# Patient Record
(demographics unavailable — no encounter records)

---

## 2025-01-08 NOTE — ASSESSMENT
[FreeTextEntry1] : 68 yo male with microscopic hematuria. Given age and smoking history he is high risk and will undergo CT urogram and cystoscopy.  Plan Urinalysis Urine culture CT urogram FU after CT scan to discuss images and will schedule cystoscopy   Patient is being seen today for evaluation and management of a chronic and longitudinal ongoing condition and I am of the primary treating physician.

## 2025-01-08 NOTE — HISTORY OF PRESENT ILLNESS
[FreeTextEntry1] : Polish  #[id number]  68 yo male with microscopic hematuria. States he has never had gross hematuria. He went to his PCP beginning of Dec 2024 and was told he had microscopic hematuria. Denies dysuria. States he does have urinary frequency because he takes furosemide. He has history of heart valve replacement and takes xerelto.   Previous smoker. He quit about 15 years ago.

## 2025-01-10 NOTE — HISTORY OF PRESENT ILLNESS
[Former] : former [>= 20 pack years] : >= 20 pack years [Never] : never [TextBox_4] : 67 year old patient presents for evaluation of  cough sides of lung hurt.  Former smoker   Has CT chest  8/26/24  in [place].  Per report, nodules up to 6 mm, scarring emphysema, splenomegaly     He is on Xarelto  He is on inhaled bronchodilator   he used Trelegy  recently in [place]   [place]   [id number] He lives in [place]  He states he stopped smoking  15 years ago    PSH:    heart valve replacement   eye injury   PMH:  DM  COPD  HTN  valve replacement    HLD  on diuretic furosemide   SH:   never smoker   former smoker  >20 pk years quit 15 years ago     ETOH:  occasional       ALLERGY:   NKDA     environmental/seasonal allergy:  none       Review of Systems:    no sinusitis, sinus infections, nasal obstruction no dysphagia no dry mouth      no pneumonia  no lung cancer   no CAD no MI no chest pain no murmur    no peptic ulcer or gastritis no GERD no abdominal pain   no thyroid disease      no bleeding   no DVT or PE   no kidney disease   no stroke no seizure                         [TextBox_11] : 1 [TextBox_13] : 30+ [YearQuit] : 2010

## 2025-01-10 NOTE — PHYSICAL EXAM
[No Acute Distress] : no acute distress [Well Nourished] : well nourished [Well Developed] : well developed [Normal Oropharynx] : normal oropharynx [III] : Mallampati Class: III [Normal Appearance] : normal appearance [No Neck Mass] : no neck mass [Normal Rate/Rhythm] : normal rate/rhythm [Normal S1, S2] : normal s1, s2 [No Murmurs] : no murmurs [No Resp Distress] : no resp distress [Clear to Auscultation Bilaterally] : clear to auscultation bilaterally [No Abnormalities] : no abnormalities [Benign] : benign [Normal Gait] : normal gait [No Clubbing] : no clubbing [No Cyanosis] : no cyanosis [No Edema] : no edema [FROM] : FROM [Normal Color/ Pigmentation] : normal color/ pigmentation [No Focal Deficits] : no focal deficits [Oriented x3] : oriented x3 [Normal Affect] : normal affect [TextBox_68] : good aeration

## 2025-01-10 NOTE — DISCUSSION/SUMMARY
[FreeTextEntry1] : 67 year old patient presents for evaluation of  cough sides of lung hurt.  Former smoker   Has CT chest  8/26/24  in [place].  Per report, nodules up to 6 mm, scarring emphysema, splenomegaly     He is on Xarelto  He is on inhaled bronchodilator   he used Trelegy  recently in [place]   [place]   [id number] He lives in [place]  He states he stopped smoking  15 years ago   He worked grinding stone, sandblasting  No  history of TB   Impression  COPD, obstructive airway disease    lung nodules  increased cough  PLAN Restart on Trelegy  once daily  with albuterol MDI as needed Repeat CT chest to follow nodules  I have explained via  that his CT chest in [place] that was reviewed using  demonstrated lung nodules that could represent early lung cancer   CT chest ordered . The order has been placed.  Pt is agreeable.   Monitor symptoms  follow up   Total time of the encounter: 45 minutes which included but was not limited to the following: Face-to-face and non face-to-face time personally spent by the physician preparing to see the patient, obtaining and/or resuming separately obtained history, performing a medically appropriate examination and/or evaluation, counseling and educating the patient/family/caregiver, ordering medications, tests or procedures, referring and communicating with other healthcare professionals, documenting clinical information in the electronic health record, independently interpreting results and communicated results to the patient/family/caregiver and care coordination.    Hai Mills MD

## 2025-01-10 NOTE — REASON FOR VISIT
[Consultation] : a consultation [Cough] : cough [COPD] : COPD [Pulmonary Nodules] : pulmonary nodules

## 2025-01-10 NOTE — HISTORY OF PRESENT ILLNESS
[Former] : former [>= 20 pack years] : >= 20 pack years [Never] : never [TextBox_4] : 67 year old patient presents for evaluation of  cough sides of lung hurt.  Former smoker   Has CT chest  8/26/24  in [place].  Per report, nodules up to 6 mm, scarring emphysema, splenomegaly     He is on Xarelto  He is on inhaled bronchodilator   he used Trelegy  recently in [place]   [place]   [id number] He lives in [place]  He states he stopped smoking  15 years ago    PSH:    heart valve replacement   eye injury   PMH:  DM  COPD  HTN  valve replacement    HLD  on diuretic furosemide   SH:   never smoker   former smoker  >20 pk years quit 15 years ago     ETOH:  occasional       ALLERGY:   NKDA     environmental/seasonal allergy:  none       Review of Systems:    no sinusitis, sinus infections, nasal obstruction no dysphagia no dry mouth      no pneumonia  no lung cancer   no CAD no MI no chest pain no murmur    no peptic ulcer or gastritis no GERD no abdominal pain   no thyroid disease      no bleeding   no DVT or PE   no kidney disease   no stroke no seizure                         [TextBox_11] : 1 [TextBox_13] : 30+ [YearQuit] : 2010

## 2025-01-10 NOTE — DISCUSSION/SUMMARY
[FreeTextEntry1] : 67 year old patient presents for evaluation of  cough sides of lung hurt.  Former smoker   Has CT chest  8/26/24  in [place].  Per report, nodules up to 6 mm, scarring emphysema, splenomegaly     He is on Xarelto  He is on inhaled bronchodilator   he used Trelegy  recently in [place]   [place]   [id number] He lives in [place]  He states he stopped smoking  15 years ago   He worked grinding stone, sandblasting  No  history of TB   Impression  COPD, obstructive airway disease    lung nodules  increased cough  PLAN Restart on Trelegy  once daily  with albuterol MDI as needed Repeat CT chest to follow nodules  I have explained via  that his CT chest in [place] that was reviewed using  demonstrated lung nodules that could represent early lung cancer   CT chest ordered . The order has been placed.  Pt is agreeable.   Monitor symptoms  follow up   Total time of the encounter: 45 minutes which included but was not limited to the following: Face-to-face and non face-to-face time personally spent by the physician preparing to see the patient, obtaining and/or resuming separately obtained history, performing a medically appropriate examination and/or evaluation, counseling and educating the patient/family/caregiver, ordering medications, tests or procedures, referring and communicating with other healthcare professionals, documenting clinical information in the electronic health record, independently interpreting results and communicated results to the patient/family/caregiver and care coordination.    Hai Mills MD

## 2025-01-15 NOTE — HISTORY OF PRESENT ILLNESS
[FreeTextEntry1] : Polish  #[id number]  66 yo male with microscopic hematuria. States he has never had gross hematuria. He went to his PCP beginning of Dec 2024 and was told he had microscopic hematuria. Denies dysuria. States he does have urinary frequency because he takes furosemide. He has history of heart valve replacement and takes xerelto.   Previous smoker. He quit about 15 years ago.   We discussed his CT Urogram results demonstrating no hydro stones or masses. Bladder with circumferential thickening. Prostate about 35 g.    States he voids frequently during the day and has nocturia x2-3.  Discussed need for cystoscopy however patient states he has had bad experiences in the past with [facility] catheter placement.

## 2025-01-15 NOTE — ASSESSMENT
[FreeTextEntry1] : 68 yo male with microscopic hematuria. We reviewed his ct urogram which did not demonstrate any masses stones or hydro. It does show a prostate about 35 g and symmetrical thickening of bladder which may be from outlet obstruction/BPH. He will trial tamsulosin 0.4 mg daily. Discussed need for cystoscopy to complete evaluation of microscopic hematuria.   Plan Urinalysis reviewed - glucose >1000 Urine culture reviewed - negative CT urogram images reviewed and discussed with patient demonstrating no stones masses or hydros. bladder with circumferential thickening and prostate size about 35 g Trial tamsulosin 0.4 mg daily - prescription sent Schedule for cystoscopy and will do uroflow with PVR after   Patient is being seen today for evaluation and management of a chronic and longitudinal ongoing condition and I am of the primary treating physician.

## 2025-01-26 NOTE — REASON FOR VISIT
[Follow-Up] : a follow-up visit [Cough] : cough [Pulmonary Nodules] : pulmonary nodules [COPD] : COPD

## 2025-01-31 NOTE — HISTORY OF PRESENT ILLNESS
[Former] : former [>= 20 pack years] : >= 20 pack years [Never] : never [TextBox_4] : 67 year old patient presents for evaluation of  cough sides of lung hurt.  Former smoker   Has CT chest  8/26/24  in [place].  Per report, nodules up to 6 mm, scarring emphysema, splenomegaly     He is on Xarelto  He is on inhaled bronchodilator   he used Trelegy  recently in [place]   [place]   [id number] He lives in [place]  He states he stopped smoking  15 years ago   PMD is [facility]  [phone], fax [phone]   PSH:    heart valve replacement   eye injury   PMH:  DM  COPD  HTN  valve replacement    HLD  on diuretic furosemide   SH:   never smoker   former smoker  >20 pk years quit 15 years ago     ETOH:  occasional       ALLERGY:   NKDA     environmental/seasonal allergy:  none       Review of Systems:    no sinusitis, sinus infections, nasal obstruction no dysphagia no dry mouth      no pneumonia  no lung cancer   no CAD no MI no chest pain no murmur    no peptic ulcer or gastritis no GERD no abdominal pain   no thyroid disease      no bleeding   no DVT or PE   no kidney disease   no stroke no seizure                         [TextBox_11] : 1 [TextBox_13] : 30+ [YearQuit] : 2010

## 2025-01-31 NOTE — PHYSICAL EXAM
[No Acute Distress] : no acute distress [Well Nourished] : well nourished [Well Developed] : well developed [Normal Oropharynx] : normal oropharynx [III] : Mallampati Class: III [Normal Appearance] : normal appearance [No Neck Mass] : no neck mass [Normal Rate/Rhythm] : normal rate/rhythm [No Murmurs] : no murmurs [Normal S1, S2] : normal s1, s2 [No Resp Distress] : no resp distress [Clear to Auscultation Bilaterally] : clear to auscultation bilaterally [No Abnormalities] : no abnormalities [Benign] : benign [No Clubbing] : no clubbing [Normal Gait] : normal gait [No Cyanosis] : no cyanosis [No Edema] : no edema [FROM] : FROM [Normal Color/ Pigmentation] : normal color/ pigmentation [No Focal Deficits] : no focal deficits [Oriented x3] : oriented x3 [Normal Affect] : normal affect [TextBox_68] : good aeration

## 2025-01-31 NOTE — PHYSICAL EXAM
[No Acute Distress] : no acute distress [Well Nourished] : well nourished [Well Developed] : well developed [III] : Mallampati Class: III [Normal Oropharynx] : normal oropharynx [Normal Appearance] : normal appearance [No Neck Mass] : no neck mass [Normal Rate/Rhythm] : normal rate/rhythm [No Murmurs] : no murmurs [Normal S1, S2] : normal s1, s2 [No Resp Distress] : no resp distress [Clear to Auscultation Bilaterally] : clear to auscultation bilaterally [No Abnormalities] : no abnormalities [Benign] : benign [Normal Gait] : normal gait [No Clubbing] : no clubbing [No Cyanosis] : no cyanosis [No Edema] : no edema [FROM] : FROM [Normal Color/ Pigmentation] : normal color/ pigmentation [No Focal Deficits] : no focal deficits [Oriented x3] : oriented x3 [Normal Affect] : normal affect [TextBox_68] : good aeration

## 2025-01-31 NOTE — DISCUSSION/SUMMARY
[FreeTextEntry1] : 67 year old patient presents for evaluation of  cough sides of lung hurt.  Former smoker   Has CT chest  8/26/24  in [place].  Per report, nodules up to 6 mm, scarring emphysema, splenomegaly     He is on Xarelto  He is on inhaled bronchodilator   he used Trelegy  recently in [place]   [place]   [id number]  [place] in [place]  He states he stopped smoking  15 years ago   He worked grinding stone, sandblasting  No  history of TB  CT chest was performed:  1/11/25.  It demonstrated moderate emphysema and 1.9 cm GGO nodule with central cystic component  in RLL which is new , raising suspicion for low grade adenoCa.  He has prosthetic heart valves  I discussed findings with the patient and son.  PFT 1/10/25 demonstrated moderate obstructive airway disease   Impression  COPD, obstructive airway disease    lung nodules, new 1.9  cm GGO lesion RLL  increased cough  Started Trelegy  once daily  with albuterol MDI as needed  he will  continue this  Repeat CT chest 06/2025  The order has been placed.  Pt is agreeable.   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  He is unaware  He has had recent blood testing and will provide results  he is going to [place] in 1 week and will return  follow up   Total time of the encounter: 40 minutes which included but was not limited to the following: Face-to-face and non face-to-face time personally spent by the physician preparing to see the patient, obtaining and/or resuming separately obtained history, performing a medically appropriate examination and/or evaluation, counseling and educating the patient/family/caregiver, ordering medications, tests or procedures, referring and communicating with other healthcare professionals, documenting clinical information in the electronic health record, independently interpreting results and communicated results to the patient/family/caregiver and care coordination.    Hai Mills MD

## 2025-01-31 NOTE — HISTORY OF PRESENT ILLNESS
[Former] : former [>= 20 pack years] : >= 20 pack years [Never] : never [TextBox_4] : 67 year old patient presents for evaluation of  cough sides of lung hurt.  Former smoker   Has CT chest  8/26/24  in [place].  Per report, nodules up to 6 mm, scarring emphysema, splenomegaly     He is on Xarelto  He is on inhaled bronchodilator   he used Trelegy  recently in [place]   [place]   [id number] He lives in [place]  He states he stopped smoking  15 years ago   PMD is [facility]  [phone], fax [phone]   PSH:    heart valve replacement   eye injury   PMH:  DM  COPD  HTN  valve replacement    HLD  on diuretic furosemide   SH:   never smoker   former smoker  >20 pk years quit 15 years ago     ETOH:  occasional       ALLERGY:   NKDA     environmental/seasonal allergy:  none       Review of Systems:    no sinusitis, sinus infections, nasal obstruction no dysphagia no dry mouth      no pneumonia  no lung cancer   no CAD no MI no chest pain no murmur    no peptic ulcer or gastritis no GERD no abdominal pain   no thyroid disease      no bleeding   no DVT or PE   no kidney disease   no stroke no seizure                         [TextBox_11] : 1 [TextBox_13] : 30+ [YearQuit] : 2010

## 2025-01-31 NOTE — DISCUSSION/SUMMARY
[FreeTextEntry1] : 67 year old patient presents for evaluation of  cough sides of lung hurt.  Former smoker   Has CT chest  8/26/24  in [place].  Per report, nodules up to 6 mm, scarring emphysema, splenomegaly     He is on Xarelto  He is on inhaled bronchodilator   he used Trelegy  recently in [place]   [place]   [id number]  [place] He lives in [place]  He states he stopped smoking  15 years ago   He worked grinding stone, sandblasting  No  history of TB  CT chest was performed:  1/11/25.  It demonstrated moderate emphysema and 1.9 cm GGO nodule with central cystic component  in RLL which is new , raising suspicion for low grade adenoCa.  He has prosthetic heart valves  I discussed findings with the patient and son.  PFT 1/10/25 demonstrated moderate obstructive airway disease   Impression  COPD, obstructive airway disease    lung nodules, new 1.9  cm GGO lesion RLL  increased cough  Started Trelegy  once daily  with albuterol MDI as needed  he will  continue this  Repeat CT chest 06/2025  The order has been placed.  Pt is agreeable.   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  He is unaware  He has had recent blood testing and will provide results  he is going to [place] in 1 week and will return  follow up   Total time of the encounter: 40 minutes which included but was not limited to the following: Face-to-face and non face-to-face time personally spent by the physician preparing to see the patient, obtaining and/or resuming separately obtained history, performing a medically appropriate examination and/or evaluation, counseling and educating the patient/family/caregiver, ordering medications, tests or procedures, referring and communicating with other healthcare professionals, documenting clinical information in the electronic health record, independently interpreting results and communicated results to the patient/family/caregiver and care coordination.    Hai Mills MD